# Patient Record
Sex: FEMALE | Race: WHITE | NOT HISPANIC OR LATINO | URBAN - METROPOLITAN AREA
[De-identification: names, ages, dates, MRNs, and addresses within clinical notes are randomized per-mention and may not be internally consistent; named-entity substitution may affect disease eponyms.]

---

## 2018-02-08 ENCOUNTER — APPOINTMENT (RX ONLY)
Dept: URBAN - METROPOLITAN AREA CLINIC 13 | Facility: CLINIC | Age: 83
Setting detail: DERMATOLOGY
End: 2018-02-08

## 2018-02-08 VITALS
SYSTOLIC BLOOD PRESSURE: 140 MMHG | DIASTOLIC BLOOD PRESSURE: 78 MMHG | HEIGHT: 65 IN | HEART RATE: 81 BPM | WEIGHT: 140 LBS

## 2018-02-08 DIAGNOSIS — Z71.89 OTHER SPECIFIED COUNSELING: ICD-10-CM

## 2018-02-08 DIAGNOSIS — L57.8 OTHER SKIN CHANGES DUE TO CHRONIC EXPOSURE TO NONIONIZING RADIATION: ICD-10-CM

## 2018-02-08 DIAGNOSIS — D485 NEOPLASM OF UNCERTAIN BEHAVIOR OF SKIN: ICD-10-CM

## 2018-02-08 DIAGNOSIS — L81.4 OTHER MELANIN HYPERPIGMENTATION: ICD-10-CM

## 2018-02-08 DIAGNOSIS — I83.9 ASYMPTOMATIC VARICOSE VEINS OF LOWER EXTREMITIES: ICD-10-CM

## 2018-02-08 DIAGNOSIS — L90.5 SCAR CONDITIONS AND FIBROSIS OF SKIN: ICD-10-CM

## 2018-02-08 DIAGNOSIS — D22 MELANOCYTIC NEVI: ICD-10-CM

## 2018-02-08 DIAGNOSIS — L57.0 ACTINIC KERATOSIS: ICD-10-CM

## 2018-02-08 PROBLEM — I10 ESSENTIAL (PRIMARY) HYPERTENSION: Status: ACTIVE | Noted: 2018-02-08

## 2018-02-08 PROBLEM — J30.1 ALLERGIC RHINITIS DUE TO POLLEN: Status: ACTIVE | Noted: 2018-02-08

## 2018-02-08 PROBLEM — M12.9 ARTHROPATHY, UNSPECIFIED: Status: ACTIVE | Noted: 2018-02-08

## 2018-02-08 PROBLEM — Z85.828 PERSONAL HISTORY OF OTHER MALIGNANT NEOPLASM OF SKIN: Status: ACTIVE | Noted: 2018-02-08

## 2018-02-08 PROBLEM — D22.5 MELANOCYTIC NEVI OF TRUNK: Status: ACTIVE | Noted: 2018-02-08

## 2018-02-08 PROBLEM — D48.5 NEOPLASM OF UNCERTAIN BEHAVIOR OF SKIN: Status: ACTIVE | Noted: 2018-02-08

## 2018-02-08 PROBLEM — I83.91 ASYMPTOMATIC VARICOSE VEINS OF RIGHT LOWER EXTREMITY: Status: ACTIVE | Noted: 2018-02-08

## 2018-02-08 PROCEDURE — 99203 OFFICE O/P NEW LOW 30 MIN: CPT | Mod: 25

## 2018-02-08 PROCEDURE — ? EDUCATIONAL RESOURCES PROVIDED

## 2018-02-08 PROCEDURE — ? BIOPSY BY SHAVE METHOD

## 2018-02-08 PROCEDURE — ? PRESCRIPTION

## 2018-02-08 PROCEDURE — ? COUNSELING

## 2018-02-08 PROCEDURE — 11100: CPT

## 2018-02-08 PROCEDURE — ? OBSERVATION

## 2018-02-08 RX ORDER — IMIQUIMOD 50 MG/G
CREAM TOPICAL WEEKLY
Qty: 24 | Refills: 10 | COMMUNITY
Start: 2018-02-08

## 2018-02-08 RX ADMIN — IMIQUIMOD: 50 CREAM TOPICAL at 00:00

## 2018-02-08 ASSESSMENT — LOCATION ZONE DERM
LOCATION ZONE: FACE
LOCATION ZONE: TRUNK
LOCATION ZONE: LEG
LOCATION ZONE: SCALP

## 2018-02-08 ASSESSMENT — LOCATION SIMPLE DESCRIPTION DERM
LOCATION SIMPLE: LEFT UPPER BACK
LOCATION SIMPLE: RIGHT THIGH
LOCATION SIMPLE: RIGHT UPPER BACK
LOCATION SIMPLE: RIGHT FOREHEAD
LOCATION SIMPLE: SCALP
LOCATION SIMPLE: RIGHT BREAST

## 2018-02-08 ASSESSMENT — LOCATION DETAILED DESCRIPTION DERM
LOCATION DETAILED: RIGHT SUPERIOR MEDIAL UPPER BACK
LOCATION DETAILED: RIGHT PERIAREOLAR BREAST 1-2:00 REGION
LOCATION DETAILED: LEFT SUPERIOR PARIETAL SCALP
LOCATION DETAILED: RIGHT ANTERIOR DISTAL THIGH
LOCATION DETAILED: RIGHT MEDIAL FOREHEAD
LOCATION DETAILED: LEFT MEDIAL UPPER BACK

## 2018-02-08 NOTE — PROCEDURE: MIPS QUALITY
Detail Level: Detailed
Quality 128: Preventive Care And Screening: Body Mass Index (Bmi) Screening And Follow-Up Plan: BMI is documented within normal parameters and no follow-up plan is required.
Quality 110: Preventive Care And Screening: Influenza Immunization: Influenza Immunization Administered during Influenza season
Quality 111:Pneumonia Vaccination Status For Older Adults: Pneumococcal Vaccination Previously Received
Quality 226: Preventive Care And Screening: Tobacco Use: Screening And Cessation Intervention: Patient screened for tobacco and is an ex-smoker
Quality 431: Preventive Care And Screening: Unhealthy Alcohol Use - Screening: Patient screened for unhealthy alcohol use using a single question and scores less than 2 times per year
Quality 47: Advance Care Plan: Advance Care Planning discussed and documented; advance care plan or surrogate decision maker documented in the medical record.

## 2018-02-08 NOTE — PROCEDURE: BIOPSY BY SHAVE METHOD
Detail Level: Detailed
Notification Instructions: Call in 7 days
Dressing: bandage
Additional Anesthesia Volume In Cc (Will Not Render If 0): 0
Electrodesiccation Text: The wound bed was treated with electrodesiccation after the biopsy was performed.
Render Post-Care Instructions In Note?: no
Post-Care Instructions: -\\nWhen bleeding has stopped you may remove the bandage.    Cleans area with saline daily\\n\\nApply a small amount of Vaseline or Aquaphor several times a day to prevent a dried scab from forming.  May keep covered with a bandage if desired.  To aid healing it is important to keep the area covered with the ointment continually.  \\n\\nContinue this routine until the biopsy site is healed.  Face biopsies usually heal in one week, biopsies on the trunk may take two weeks to heal.   \\n\\n If surgery is required to this area, please tell us if you take aspirin, or other blood thinners.  These should be stopped before the surgery. \\n\\n Please contact the prescribing doctor to get directions regarding stopping medications.  \\n\\nYou may receive a separate bill from the lab that processes your biopsy.\\n\\nCall for in 5-7 days for biopsy results or sooner for any problems.
Biopsy Method: Personna blade
Anesthesia Volume In Cc: 0.5
Anesthesia Type: 1% lidocaine without epinephrine
Silver Nitrate Text: The wound bed was treated with silver nitrate after the biopsy was performed.
Billing Type: Third-Party Bill
Triangulation (Location Of Lesion In Relation To Distance From Anatomical Landmarks): 13 cm from left superior ear crease
Wound Care: Vaseline
Consent: The procedure was discussed with the patient.  Verbal consent was obtained and risks were reviewed including: scarring, scabbing and incomplete removal.
Cryotherapy Text: The wound bed was treated with cryotherapy after the biopsy was performed.
Type Of Destruction Used: Curettage
Hemostasis: Drysol
Electrodesiccation And Curettage Text: The wound bed was treated with electrodesiccation and curettage after the biopsy was performed.
Biopsy Type: H and E

## 2018-02-08 NOTE — PROCEDURE: OBSERVATION
X Size Of Lesion In Cm (Optional): 0
Body Location Override (Optional - Billing Will Still Be Based On Selected Body Map Location If Applicable): breast
Detail Level: Detailed
Body Location Override (Optional - Billing Will Still Be Based On Selected Body Map Location If Applicable): legs
Detail Level: Generalized
Detail Level: Zone

## 2018-08-28 ENCOUNTER — APPOINTMENT (RX ONLY)
Dept: URBAN - METROPOLITAN AREA CLINIC 13 | Facility: CLINIC | Age: 83
Setting detail: DERMATOLOGY
End: 2018-08-28

## 2018-08-28 DIAGNOSIS — L81.4 OTHER MELANIN HYPERPIGMENTATION: ICD-10-CM

## 2018-08-28 DIAGNOSIS — L57.0 ACTINIC KERATOSIS: ICD-10-CM

## 2018-08-28 PROBLEM — L70.0 ACNE VULGARIS: Status: ACTIVE | Noted: 2018-08-28

## 2018-08-28 PROBLEM — D48.5 NEOPLASM OF UNCERTAIN BEHAVIOR OF SKIN: Status: ACTIVE | Noted: 2018-08-28

## 2018-08-28 PROCEDURE — ? OBSERVATION

## 2018-08-28 PROCEDURE — 17000 DESTRUCT PREMALG LESION: CPT

## 2018-08-28 PROCEDURE — ? LIQUID NITROGEN

## 2018-08-28 PROCEDURE — 99213 OFFICE O/P EST LOW 20 MIN: CPT | Mod: 25

## 2018-08-28 PROCEDURE — ? PRESCRIPTION

## 2018-08-28 RX ORDER — FLUOCINOLONE ACETONIDE, HYDROQUINONE, AND TRETINOIN .1; 40; .5 MG/G; MG/G; MG/G
CREAM TOPICAL ONCE A DAY
Qty: 1 | Refills: 3 | Status: ERX | COMMUNITY
Start: 2018-08-28

## 2018-08-28 RX ADMIN — FLUOCINOLONE ACETONIDE, HYDROQUINONE, AND TRETINOIN: .1; 40; .5 CREAM TOPICAL at 00:00

## 2018-08-28 ASSESSMENT — LOCATION ZONE DERM
LOCATION ZONE: HAND
LOCATION ZONE: FACE
LOCATION ZONE: SCALP

## 2018-08-28 ASSESSMENT — LOCATION SIMPLE DESCRIPTION DERM
LOCATION SIMPLE: RIGHT HAND
LOCATION SIMPLE: LEFT CHEEK
LOCATION SIMPLE: SCALP
LOCATION SIMPLE: LEFT HAND

## 2018-08-28 ASSESSMENT — LOCATION DETAILED DESCRIPTION DERM
LOCATION DETAILED: RIGHT RADIAL DORSAL HAND
LOCATION DETAILED: LEFT RADIAL DORSAL HAND
LOCATION DETAILED: LEFT SUPERIOR PARIETAL SCALP
LOCATION DETAILED: LEFT INFERIOR CENTRAL MALAR CHEEK

## 2018-08-28 NOTE — PROCEDURE: LIQUID NITROGEN
Consent: The patient's consent was obtained including but not limited to risks of crusting, scabbing, blistering, scarring, darker or lighter pigmentary change, recurrence, incomplete removal and infection.
Number Of Freeze-Thaw Cycles: 1 freeze-thaw cycle
Render Post-Care Instructions In Note?: yes
Detail Level: Detailed
Duration Of Freeze Thaw-Cycle (Seconds): 5
Post-Care Instructions: - \\n-Liquid nitrogen is a very cold gas used to remove lesions from the skin by freezing.  This treatment usually leaves little scarring but can cause a permanent lightening of the skin in a percentage of cases. --\\n\\n-   Freezing with liquid nitrogen is accompanied by a stinging, “burning” pain that may last for minutes to hours.  Most patients do not pain medications after this initial discomfort passes.    Within several minutes the treatment area will become red and swollen. -\\n-\\nThe day after treatment the area usually looks worse, like a grease burn.  A blister may form which should flatten in two to three days.  Don’t be alarmed if the blister is large and/or full of blood.  If the blister is in an awkward or uncomfortable location, it may be decompressed with a sterile pin, but leave the blister roof intact.    -\\n--\\n-Gently wash the treatment area with saline daily.  Apply a thin coating of Vaseline or Aquaphor.   -\\n-\\n    Dried scabs actually delay healing.  It is best to keep the area moist by using a small over the ointment to prevent the wound from forming a scab.  There is no need to “expose the area to the air” for it to heal properly.   \\n-\\n-    As always do not hesitate to call the office if you have questions or concerns.\\n-

## 2019-02-05 ENCOUNTER — APPOINTMENT (RX ONLY)
Dept: URBAN - METROPOLITAN AREA CLINIC 13 | Facility: CLINIC | Age: 84
Setting detail: DERMATOLOGY
End: 2019-02-05

## 2019-02-05 DIAGNOSIS — D18.0 HEMANGIOMA: ICD-10-CM

## 2019-02-05 DIAGNOSIS — L57.0 ACTINIC KERATOSIS: ICD-10-CM

## 2019-02-05 DIAGNOSIS — I83.9 ASYMPTOMATIC VARICOSE VEINS OF LOWER EXTREMITIES: ICD-10-CM

## 2019-02-05 DIAGNOSIS — D22 MELANOCYTIC NEVI: ICD-10-CM

## 2019-02-05 DIAGNOSIS — L57.8 OTHER SKIN CHANGES DUE TO CHRONIC EXPOSURE TO NONIONIZING RADIATION: ICD-10-CM

## 2019-02-05 DIAGNOSIS — L82.1 OTHER SEBORRHEIC KERATOSIS: ICD-10-CM

## 2019-02-05 DIAGNOSIS — L81.4 OTHER MELANIN HYPERPIGMENTATION: ICD-10-CM

## 2019-02-05 PROBLEM — I83.91 ASYMPTOMATIC VARICOSE VEINS OF RIGHT LOWER EXTREMITY: Status: ACTIVE | Noted: 2019-02-05

## 2019-02-05 PROBLEM — D22.5 MELANOCYTIC NEVI OF TRUNK: Status: ACTIVE | Noted: 2019-02-05

## 2019-02-05 PROBLEM — D18.01 HEMANGIOMA OF SKIN AND SUBCUTANEOUS TISSUE: Status: ACTIVE | Noted: 2019-02-05

## 2019-02-05 PROBLEM — D48.5 NEOPLASM OF UNCERTAIN BEHAVIOR OF SKIN: Status: ACTIVE | Noted: 2019-02-05

## 2019-02-05 PROCEDURE — 17000 DESTRUCT PREMALG LESION: CPT | Mod: 59

## 2019-02-05 PROCEDURE — ? COUNSELING

## 2019-02-05 PROCEDURE — ? OBSERVATION

## 2019-02-05 PROCEDURE — ? PRESCRIPTION

## 2019-02-05 PROCEDURE — ? LIQUID NITROGEN (COSMETIC)

## 2019-02-05 PROCEDURE — ? LIQUID NITROGEN

## 2019-02-05 PROCEDURE — 99214 OFFICE O/P EST MOD 30 MIN: CPT | Mod: 25

## 2019-02-05 PROCEDURE — ? BIOPSY BY SHAVE METHOD

## 2019-02-05 PROCEDURE — 17003 DESTRUCT PREMALG LES 2-14: CPT

## 2019-02-05 PROCEDURE — 11102 TANGNTL BX SKIN SINGLE LES: CPT

## 2019-02-05 RX ORDER — IMIQUIMOD 50 MG/G
CREAM TOPICAL WEEKLY
Qty: 24 | Refills: 11 | Status: ERX | COMMUNITY
Start: 2019-02-05

## 2019-02-05 RX ADMIN — IMIQUIMOD: 50 CREAM TOPICAL at 17:25

## 2019-02-05 ASSESSMENT — LOCATION SIMPLE DESCRIPTION DERM
LOCATION SIMPLE: UPPER BACK
LOCATION SIMPLE: SCALP
LOCATION SIMPLE: INFERIOR FOREHEAD
LOCATION SIMPLE: RIGHT THIGH
LOCATION SIMPLE: RIGHT CHEEK
LOCATION SIMPLE: POSTERIOR SCALP

## 2019-02-05 ASSESSMENT — LOCATION DETAILED DESCRIPTION DERM
LOCATION DETAILED: RIGHT CENTRAL BUCCAL CHEEK
LOCATION DETAILED: LEFT SUPERIOR PARIETAL SCALP
LOCATION DETAILED: INFERIOR THORACIC SPINE
LOCATION DETAILED: LEFT CENTRAL PARIETAL SCALP
LOCATION DETAILED: RIGHT ANTERIOR DISTAL THIGH
LOCATION DETAILED: SUPERIOR THORACIC SPINE
LOCATION DETAILED: INFERIOR MID FOREHEAD
LOCATION DETAILED: POSTERIOR MID-PARIETAL SCALP

## 2019-02-05 ASSESSMENT — LOCATION ZONE DERM
LOCATION ZONE: TRUNK
LOCATION ZONE: SCALP
LOCATION ZONE: LEG
LOCATION ZONE: FACE

## 2019-02-05 NOTE — PROCEDURE: BIOPSY BY SHAVE METHOD
Notification Instructions: Call in 7 days
Electrodesiccation And Curettage Text: The wound bed was treated with electrodesiccation and curettage after the biopsy was performed.
Cryotherapy Text: The wound bed was treated with cryotherapy after the biopsy was performed.
Detail Level: Detailed
Electrodesiccation Text: The wound bed was treated with electrodesiccation after the biopsy was performed.
Triangulation (Location Of Lesion In Relation To Distance From Anatomical Landmarks): 2.5 cm right of midline
Bill 96304 For Specimen Handling/Conveyance To Laboratory?: no
Was A Bandage Applied: Yes
Biopsy Type: H and E
Consent: The procedure was discussed with the patient.  Verbal consent was obtained and risks were reviewed including: scarring, scabbing and incomplete removal.
Hemostasis: Drysol
Wound Care: Vaseline
Depth Of Biopsy: dermis
Billing Type: Third-Party Bill
Silver Nitrate Text: The wound bed was treated with silver nitrate after the biopsy was performed.
Anesthesia Volume In Cc: 1
Type Of Destruction Used: Curettage
Post-Care Instructions: -\\nWhen bleeding has stopped you may remove the bandage.    Cleans area with saline daily\\n\\nApply a small amount of Vaseline or Aquaphor several times a day to prevent a dried scab from forming.  May keep covered with a bandage if desired.  To aid healing it is important to keep the area covered with the ointment continually.  \\n\\nContinue this routine until the biopsy site is healed.  Face biopsies usually heal in one week, biopsies on the trunk may take two weeks to heal.   \\n\\n If surgery is required to this area, please tell us if you take aspirin, or other blood thinners.  These should be stopped before the surgery. \\n\\n Please contact the prescribing doctor to get directions regarding stopping medications.  \\n\\nYou may receive a separate bill from the lab that processes your biopsy.\\n\\nCall for in 5-7 days for biopsy results or sooner for any problems.
Anesthesia Type: 1% lidocaine with epinephrine and a 1:10 solution of 8.4% sodium bicarbonate
X Size Of Lesion In Cm: 0
Dressing: bandage
Biopsy Method: Personna blade
Size Of Lesion In Cm: 0.6

## 2019-02-05 NOTE — PROCEDURE: LIQUID NITROGEN (COSMETIC)
Post-Care Instructions: Liquid nitrogen is a very cold gas used to remove lesions from the skin by freezing.  This treatment usually leaves little scarring but can cause a permanent lightening of the skin in a percentage of cases.\\n\\n  Freezing with liquid nitrogen is accompanied by a stinging, “burning” pain that may last for minutes to hours.  Most patients do not pain medications after this initial discomfort passes.\\n\\n  Within several minutes the treatment area will become red and swollen. The day after treatment the area usually looks worse, like a grease burn.  A blister may form which should flatten in two to three days.  Don’t be alarmed if the blister is large and/or full of blood.  If the blister is in an awkward or uncomfortable location, it may be decompressed with a sterile pin, but leave the blister roof intact.\\n\\n  Gently wash the treatment area with your fingertip and antibacterial soap and water daily.  Apply a thin coating of POLYSPORIN or BACITRACIN OINTMENT (can be purchased over-the-counter in most drug stores) over the treated area.\\n\\n  We do NOT recommend NEOSPORIN, triple antibiotic ointment or neomycin containing ointments as these are more likely to cause allergy and these products usually contain ingredients already found in Polysporin or Bacitracin.\\n\\n  Let us know if you have an allergy to any antibiotic ointment, we can provide a prescription for a topical antibiotic that is not chemically related.  \\n\\n  Dried scabs actually delay healing.  It is best to keep the area moist by using a small over the ointment to prevent the wound from forming a scab.  There is no need to “expose the area to the air” for it to heal properly.\\n\\n  Patients with warts should be seen in two to three weeks if the warts are not completely gone.  If the frozen area does not fall off in a month after freezing, call the office. The area may need to be retreated.  Premalignant or malignant lesions especially need to be checked if they do not completely resolve after treatment.\\n\\n  Studies have suggest that patients who have had one non-melanoma skin cancer have a 50% chance of having a new cancer develop somewhere else on their body during the next 5 years.  Even in cases where the cancer was completely treated there are reported cases of skin cancer returning at the same site months to years later.  All patients should avoid the sun by using sunscreen and protective clothing, perform monthly total-body self-examinations of the skin (including under the underwear and in skin fold areas) and return at the earliest sign of a changing skin lesion regularly for a thorough skin exam.  \\n\\Jason always do not hesitate to call the office if you have questions or concerns.
Render Post-Care Instructions In Note?: yes
Price (Use Numbers Only, No Special Characters Or $): 125
Consent: The patient's consent was obtained including but not limited to risks of crusting, scabbing, blistering, scarring, darker or lighter pigmentary change, recurrence, incomplete removal and infection. The patient understands that the procedure is cosmetic in nature and is not covered by insurance.
Detail Level: Detailed

## 2019-02-05 NOTE — PROCEDURE: OBSERVATION
Body Location Override (Optional - Billing Will Still Be Based On Selected Body Map Location If Applicable): legs
Detail Level: Zone
Detail Level: Generalized
Size Of Lesion In Cm (Optional): 0

## 2019-02-05 NOTE — PROCEDURE: LIQUID NITROGEN
Render Post-Care Instructions In Note?: no
Post-Care Instructions: - \\n  This treatment usually leaves little scarring but can cause a permanent lightening of the skin in a percentage of cases. --\\n\\n-   Freezing with liquid nitrogen is accompanied by a stinging, “burning” pain that may last for minutes to hours.  Most patients do not pain medications after this initial discomfort passes.    Within several minutes the treatment area will become red and swollen. -\\n-\\nThe day after treatment the area usually looks worse, like a grease burn.  A blister may form which should flatten in two to three days.  Don’t be alarmed if the blister is large and/or full of blood.  If the blister is in an awkward or uncomfortable location, it may be decompressed with a sterile pin, but leave the blister roof intact.    -\\n--\\n-Gently wash the treatment area with saline daily.  Apply a thin coating of Vaseline or Aquaphor.   -\\n-\\n    Dried scabs actually delay healing.  It is best to keep the area moist by using a small over the ointment to prevent the wound from forming a scab.  There is no need to “expose the area to the air” for it to heal properly.   \\n-\\n-    As always do not hesitate to call the office if you have questions or concerns.\\n-
Duration Of Freeze Thaw-Cycle (Seconds): 15
Detail Level: Detailed
Consent: The patient's consent was obtained including but not limited to risks of crusting, scabbing, blistering, scarring, darker or lighter pigmentary change, recurrence, incomplete removal and infection.  Discussed pathology with patient and reason for treatment
Number Of Freeze-Thaw Cycles: 2 freeze-thaw cycles

## 2019-02-05 NOTE — HPI: EVALUATION OF SKIN LESION(S)
What Type Of Note Output Would You Prefer (Optional)?: Standard Output
Hpi Title: Evaluation of Skin Lesions
How Severe Are Your Spot(S)?: mild
Have Your Spot(S) Been Treated In The Past?: has not been treated
Additional History: \\n\\nThe patient presents for a full skin check.